# Patient Record
(demographics unavailable — no encounter records)

---

## 2024-11-22 NOTE — HISTORY OF PRESENT ILLNESS
[FreeTextEntry1] : Watson Dominguez is a 56-year-old M with no significant PMHx presenting accompanied by wife for routine colon cancer screening.  Denies bowel complaints, typically has bowel movements regularly without significant straining or overt bleeding such as melena or hematochezia. Denies upper GI symptoms such as GERD, nausea, vomiting, or dysphagia. Denies unintentional weight loss.  Denies FMH of CRC. Last colonoscopy 2020

## 2024-11-22 NOTE — ASSESSMENT
[FreeTextEntry1] : 56-year-old M presenting for CRC screening.  Plan: # Colon cancer screening: Given lack of concerning symptoms as well as no family hx of CRC based on prior findings on 2019 COL (no polyps) patient not due for procedure according to ACG guidelines. Pt will be due for repeat screening COL in 2030 unless symptoms arise sooner. Discussed other screening options with patient including Cologuard testing, pt amenable to this plan. - Cologuard form filled out and signed. Risks vs. benefits of test discussed with patient, including limitations of testing/rate of false positives. Patient advised that a positive result will require a diagnostic colonoscopy for further investigation. Patient verbalized understanding and agrees to test.  Pt discussed with MD Jimenez.  I, Dr. Jimenez, personally performed the evaluation and management (E/M) services for this established patient who presents today with (a) new problem(s)/exacerbation of (an) existing condition(s). That E/M includes conducting the examination, assessing all new/exacerbated conditions, and establishing a new plan of care. Today, my NPP, Sarah Fitzpatrick, was here to observe my evaluation and management services for this new problem/exacerbated condition to be followed going forward.

## 2024-12-05 NOTE — HISTORY OF PRESENT ILLNESS
[de-identified] : Pt. presents for skin check; c/o few spots of concern;  used 5FU on temples in 2023,  Severity:  mild   Modifying factors:  none Associated symptoms:  none Context:  no association with activity  Hx AKs- recent SCC L frontal scalp; D&C 12/2022 Recent BCC R shoulder D&C 7/2024

## 2024-12-05 NOTE — ASSESSMENT
[FreeTextEntry1] : Complete skin examination is negative for malignancy; Multiple new concerns were addressed and discussed. Therapeutic options and their risks and benefits; along with multiple diagnostic possibilities were discussed at length; risks and benefits of skin biopsy and/or other further study were discussed;  Recent D&C site healed well R shoulder  Follow up for TBSE in 6 months - recent SCC L frontal scalp 12/2022  patchy AKs on face, scalp Risks and benefits of topical actinic keratosis treatments were discussed including redness, scaling, discomfort crusting, oozing, and recurrence. 5FU x 2 wks to scalp, temples, re-treat over winter; did well in past also tx area of prior SCC

## 2024-12-05 NOTE — PHYSICAL EXAM
[Full Body Skin Exam Performed] : performed [FreeTextEntry3] : Skin examination performed of the face, neck, trunk, arms, legs;  The patient is well, alert and oriented, pleasant and cooperative. Eyelids, conjunctivae, oral mucosa, digits and nails all normal.   No cervical adenopathy.  Normal findings include:  Multiple benign nevi were noted. + fleshy compound nevi & angioma on scalp Angiomas Lentigines healed D&c scar frontal scalp in hair  scaling erythematous papules; patchy, partially treated: nose dorsum, crown of scalp  healed D&C site w/slight hypertrophy R anterior shoulder  No lesions suspicious for malignancy.

## 2025-05-29 NOTE — HISTORY OF PRESENT ILLNESS
[de-identified] : Pt. presents for skin check; c/o few spots of concern;  used 5FU on temples in 2023, scalp in 2024 Severity:  mild   Modifying factors:  none Associated symptoms:  none Context:  no association with activity  Hx AKs- recent SCC L frontal scalp; D&C 12/2022 Recent BCC R shoulder D&C 7/2024

## 2025-05-29 NOTE — ASSESSMENT
[FreeTextEntry1] : Complete skin examination is negative for malignancy; Multiple new concerns were addressed and discussed. Therapeutic options and their risks and benefits; along with multiple diagnostic possibilities were discussed at length; risks and benefits of skin biopsy and/or other further study were discussed;  Recent D&C site healed well R shoulder  Follow up for TBSE in 6 months - recent SCC L frontal scalp 12/2022 BCC 2024  patchy AKs on face, cryo to few residual lesions on scalp Risks and benefits of topical actinic keratosis treatments were discussed including redness, scaling, discomfort crusting, oozing, and recurrence.  5FU x 2 wks to left temple, nose bridge

## 2025-05-29 NOTE — PHYSICAL EXAM
[Full Body Skin Exam Performed] : performed [FreeTextEntry3] : Skin examination performed of the face, neck, trunk, arms, legs;  The patient is well, alert and oriented, pleasant and cooperative. Eyelids, conjunctivae, oral mucosa, digits and nails all normal.   No cervical adenopathy.  Normal findings include:  Multiple benign nevi were noted. + fleshy compound nevi & angioma on scalp Angiomas Lentigines healed D&C scar frontal scalp in hair- few small AKs in same area;   scaling erythematous papules; patchy, partially treated: nose dorsum, left temple/ brow  healed D&C site R anterior shoulder  No lesions suspicious for malignancy.